# Patient Record
Sex: FEMALE
[De-identification: names, ages, dates, MRNs, and addresses within clinical notes are randomized per-mention and may not be internally consistent; named-entity substitution may affect disease eponyms.]

---

## 2017-06-05 ENCOUNTER — HOSPITAL ENCOUNTER (EMERGENCY)
Dept: HOSPITAL 31 - C.ER | Age: 42
Discharge: HOME | End: 2017-06-05
Payer: MEDICAID

## 2017-06-05 VITALS
RESPIRATION RATE: 18 BRPM | SYSTOLIC BLOOD PRESSURE: 137 MMHG | OXYGEN SATURATION: 99 % | DIASTOLIC BLOOD PRESSURE: 74 MMHG | HEART RATE: 93 BPM

## 2017-06-05 VITALS — TEMPERATURE: 98 F
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
## 2017-06-05 DIAGNOSIS — S86.812A: Primary | ICD-10-CM

## 2017-06-05 NOTE — MRI
MRI left calf 



History: Tendon rupture. 



Comparison: None available. 



Technique: Multi-echo multiplanar sequences were performed through 

the left calf without the use of intravenous contrast. 



Findings: 



Prominent edema, fluid, and hemorrhage seen at the fascial interface 

of the medial head of the gastrocnemius muscle and soleus musculature 

suggestive for a possible plantaris tendon rupture. Additional signal 

abnormality seen within muscle belly of the soleus musculature 

suggestive for muscle strain and or partial tearing. 



Remainder of the visualized musculature appears preserved. 



Visualized osseous structures are preserved. 



Impression: 



Prominent edema, fluid, and hemorrhage seen at the fascial interface 

of the medial head of the gastrocnemius muscle and soleus musculature 

suggestive for a possible plantaris tendon rupture. Additional signal 

abnormality seen within muscle belly of the soleus musculature 

suggestive for muscle strain and or partial tearing.

## 2017-06-05 NOTE — C.PDOC
History Of Present Illness


41 yr old female presents to the ER stating few days ago, she was running for 

the light rail when she felt a sudden sharp pain in her left calf and was 

unable to walk afterwards. Patient states the pain has continued and its worse 

with pressure on the leg. Patient denies any known trauma or injury, back pain, 

weakness or numbness. 


Time Seen by Provider: 06/05/17 12:06


Chief Complaint (Nursing): Lower Extremity Problem/Injury


History Per: Patient


History/Exam Limitations: no limitations (Few days)


Current Symptoms Are (Timing): Still Present





Past Medical History


Reviewed: Historical Data, Nursing Documentation, Vital Signs


Vital Signs: 


 Last Vital Signs











Temp  98 F   06/05/17 11:48


 


Pulse  100 H  06/05/17 11:48


 


Resp  16   06/05/17 11:48


 


BP  128/86   06/05/17 11:48


 


Pulse Ox  97   06/05/17 15:36











Family History: States: No Known Family Hx





- Social History


Hx Alcohol Use: Yes (WINE)


Hx Substance Use: No





- Immunization History


Hx Tetanus Toxoid Vaccination: Yes





Review Of Systems


Except As Marked, All Systems Reviewed And Found Negative.


Musculoskeletal: Positive for: Other ((+) Left calf pain ).  Negative for: Back 

Pain


Neurological: Negative for: Weakness, Numbness





Physical Exam





- Physical Exam


Appears: Well, Non-toxic, No Acute Distress


Skin: Warm, Dry, No Rash


Head: Atraumatic, Normacephalic


Extremity: Calf Tenderness (Left calf, marked tenderness), Capillary Refill (<2)

, No Deformity, Swelling (Left calf, moderate swelling )


Pulses: Left Dorsalis Pedis: Normal, Right Dorsalis Pedis: Normal


Neurological/Psych: Oriented x3, Normal Speech, Normal Motor





ED Course And Treatment


O2 Sat by Pulse Oximetry: 97





- CT Scan/US


  ** MRI - Lower Extremity 


Other Rad Studies (CT/US): Read By Radiologist, Radiology Report Reviewed


CT/US Interpretation: MRI left calf.  History: Tendon rupture.  Comparison: 

None available.  Technique: Multi-echo multiplanar sequences were performed 

through the left calf without the use of intravenous contrast.  Findings:  

Prominent edema, fluid, and hemorrhage seen at the fascial interface of the 

medial head of the gastrocnemius muscle and soleus musculature suggestive for a 

possible plantaris tendon rupture. Additional signal abnormality seen within 

muscle belly of the soleus musculature suggestive for muscle strain and or 

partial tearing.  Remainder of the visualized musculature appears preserved.  

Visualized osseous structures are preserved.  Impression:  Prominent edema, 

fluid, and hemorrhage seen at the fascial interface of the medial head of the 

gastrocnemius muscle and soleus musculature suggestive for a possible plantaris 

tendon rupture. Additional signal abnormality seen within muscle belly of the 

soleus musculature suggestive for muscle strain and or partial tearing.





Medical Decision Making


Medical Decision Making: 





* MRI - Lower Extremity 


REsults discussed with pt ie strain muscle rupture


plan continue crutch walking f/u ortho rx pain meds





Disposition


Counseled Patient/Family Regarding: Diagnosis, Need For Followup





- Disposition


Referrals: 


Reggie Varghese III, MD [Staff Provider] - 


Disposition: HOME/ ROUTINE


Disposition Time: 16:02


Condition: GOOD


Prescriptions: 


Naproxen [Naprosyn] 1 tab PO BID PRN #25 tab


 PRN Reason: Pain


oxyCODONE/Acetaminophen [Percocet 5/325 mg Tab] 1 tab PO Q4H PRN #15 tab


 PRN Reason: .severe pain 


Forms:  Work Excuse





- Clinical Impression


Clinical Impression: 


 Muscle strain, Rupture of left plantaris tendon








- Scribe Statement


The provider has reviewed the documentation as recorded by the Sudhaibbailee Johnson


Provider Attestation: 


All medical record entries made by the Sudhaibbailee were at my direction and 

personally dictated by me. I have reviewed the chart and agree that the record 

accurately reflects my personal performance of the history, physical exam, 

medical decision making, and the department course for this patient. I have 

also personally directed, reviewed, and agree with the discharge instructions 

and disposition.

## 2017-07-24 ENCOUNTER — HOSPITAL ENCOUNTER (EMERGENCY)
Dept: HOSPITAL 31 - C.ER | Age: 42
Discharge: HOME | End: 2017-07-24
Payer: MEDICAID

## 2017-07-24 VITALS
TEMPERATURE: 98.9 F | RESPIRATION RATE: 18 BRPM | HEART RATE: 61 BPM | OXYGEN SATURATION: 98 % | DIASTOLIC BLOOD PRESSURE: 78 MMHG | SYSTOLIC BLOOD PRESSURE: 122 MMHG

## 2017-07-24 DIAGNOSIS — N39.0: Primary | ICD-10-CM

## 2017-07-24 LAB
BACTERIA #/AREA URNS HPF: (no result) /[HPF]
BILIRUB UR-MCNC: NEGATIVE MG/DL
GLUCOSE UR STRIP-MCNC: NORMAL MG/DL
HCG,QUALITATIVE URINE: NEGATIVE
LEUKOCYTE ESTERASE UR-ACNC: (no result) LEU/UL
PH UR STRIP: 7 [PH] (ref 5–8)
PROT UR STRIP-MCNC: NEGATIVE MG/DL
RBC # UR STRIP: (no result) /UL
SP GR UR STRIP: 1 (ref 1–1.03)
URINE NITRATE: NEGATIVE
UROBILINOGEN UR-MCNC: NORMAL MG/DL (ref 0.2–1)

## 2017-07-24 NOTE — C.PDOC
History Of Present Illness





Patient is a 40 y/o F with hx of uti's, presenting with 2 days history of 

dysuria and frequency.  She reports that she has a history of utis and reports 

that this feels similar.  Denies fever, flank pain, abdominal pain, nausea/

vomiting.


Time Seen by Provider: 07/24/17 13:14


Chief Complaint (Nursing): Female Genitourinary





Past Medical History


Vital Signs: 


 Last Vital Signs











Temp  98.9 F   07/24/17 13:13


 


Pulse  61   07/24/17 13:13


 


Resp  18   07/24/17 13:13


 


BP  122/78   07/24/17 13:13


 


Pulse Ox  98   07/24/17 14:02














- Medical History


PMH: No Chronic Diseases


Family History: States: Unknown Family Hx





- Social History


Hx Alcohol Use: Yes (WINE)


Hx Substance Use: No





- Immunization History


Hx Tetanus Toxoid Vaccination: Yes


Hx Influenza Vaccination: No


Hx Pneumococcal Vaccination: No





Review Of Systems


Constitutional: Negative for: Fever, Chills


Cardiovascular: Negative for: Chest Pain, Palpitations


Respiratory: Negative for: Cough, Shortness of Breath, SOB with Excertion, 

Wheezing


Gastrointestinal: Negative for: Nausea, Vomiting, Abdominal Pain, Diarrhea, 

Constipation


Genitourinary: Positive for: Dysuria, Frequency.  Negative for: Vaginal 

Discharge, Vaginal Bleeding, Pelvic Pain


Musculoskeletal: Negative for: Back Pain


Skin: Negative for: Rash


Neurological: Negative for: Weakness, Numbness





Physical Exam





- Physical Exam


Appears: Well, Non-toxic, No Acute Distress


Skin: Normal Color, Warm, Dry


Head: Atraumatic, Normacephalic


Eye(s): bilateral: Normal Inspection, PERRL, EOMI


Cardiovascular: Rhythm Regular


Respiratory: Normal Breath Sounds


Gastrointestinal/Abdominal: Soft, No Tenderness, No Mass, No Distention


Back: Normal Inspection, No CVA Tenderness


Extremity: Normal ROM


Neurological/Psych: Oriented x3


Gait: Steady





ED Course And Treatment


O2 Sat by Pulse Oximetry: 98





Medical Decision Making


Medical Decision Making: 





UA is consistent with uti.





Disposition





- Disposition


Disposition: HOME/ ROUTINE


Disposition Time: 14:01


Condition: GOOD


Additional Instructions: 


Follow-up with PMD within 2 days.  Return to ED if condition worsens.


Prescriptions: 


Nitrofurantoin Macrocrystals [Macrobid] 100 mg PO BID #10 cap


Instructions:  Urinary Tract Infection in Women (ED)





- Clinical Impression


Clinical Impression: 


 UTI (urinary tract infection)

## 2018-01-02 ENCOUNTER — HOSPITAL ENCOUNTER (EMERGENCY)
Dept: HOSPITAL 31 - C.ER | Age: 43
Discharge: HOME | End: 2018-01-02
Payer: MEDICAID

## 2018-01-02 VITALS
SYSTOLIC BLOOD PRESSURE: 149 MMHG | RESPIRATION RATE: 18 BRPM | OXYGEN SATURATION: 98 % | DIASTOLIC BLOOD PRESSURE: 88 MMHG | HEART RATE: 78 BPM | TEMPERATURE: 97.9 F

## 2018-01-02 DIAGNOSIS — K04.7: Primary | ICD-10-CM

## 2018-01-02 NOTE — C.PDOC
History Of Present Illness


41 y/o female, with history of a cracked tooth, presents to the ER for 

evaluation of an upper dental problem which began 3 days ago. Patient reports 

that she has a headache pain to the side of her face felt  her tongue. Patient 

denies having any other medical problems.


Time Seen by Provider: 01/02/18 09:44


Chief Complaint (Nursing): Dental Pain


History/Exam Limitations: no limitations


Onset/Duration Of Symptoms: Days


Current Symptoms Are (Timing): Still Present


Severity: Moderate





Past Medical History


Reviewed: Historical Data, Nursing Documentation, Vital Signs


Vital Signs: 


 Last Vital Signs











Temp  97.9 F   01/02/18 09:54


 


Pulse  78   01/02/18 09:54


 


Resp  18   01/02/18 09:54


 


BP  149/88   01/02/18 09:54


 


Pulse Ox  98   01/02/18 11:39














- Medical History


PMH: No Chronic Diseases


Surgical History: No Surg Hx


Family History: States: No Known Family Hx





- Social History


Hx Alcohol Use: Yes


Hx Substance Use: No





- Immunization History


Hx Tetanus Toxoid Vaccination: Yes


Hx Influenza Vaccination: No


Hx Pneumococcal Vaccination: No





Review Of Systems


Except As Marked, All Systems Reviewed And Found Negative.


Constitutional: Negative for: Fever, Chills


ENT: Negative for: Ear Pain


Respiratory: Negative for: Cough


Gastrointestinal: Negative for: Nausea, Vomiting





Physical Exam





- Physical Exam


Appears: Non-toxic, No Acute Distress


Skin: Normal Color, Warm


Head: Atraumatic, Normacephalic


Eye(s): bilateral: Normal Inspection, PERRL


Oral Mucosa: Moist


Gingiva: Normal Appearing, No Erythema, No Bleeding, No Abscess


Extremity: Normal ROM


Neurological/Psych: Oriented x3, Normal Speech, Normal Cognition, Normal Motor, 

Normal Sensation





ED Course And Treatment


O2 Sat by Pulse Oximetry: 98 (RA)


Pulse Ox Interpretation: Normal





Medical Decision Making


Medical Decision Making: 


Impression:


Upper Dental Problem


Plan:


--Tylenol - 975 mg PO


--Motrin -  600 mg PO





Disposition


Counseled Patient/Family Regarding: Diagnosis, Need For Followup, Rx Given





- Disposition


Disposition: HOME/ ROUTINE


Disposition Time: 10:12


Condition: STABLE


Additional Instructions: 


Follow up with your dentist


Prescriptions: 


Ibuprofen [Motrin] 600 mg PO TID #15 tab


Penicillin VK [Penicillin VK Tab] 500 mg PO QID #40 tab


Instructions:  Toothache (ED)


Forms:  General Discharge Instructions, [employer] (English), Work Excuse





- POA


Present On Arrival: None





- Clinical Impression


Clinical Impression: 


 Dental abscess








- Scribe Statement


The provider has reviewed the documentation as recorded by the Scribe


Eduardo Hilliard


Provider Attestation: 





All medical record entries made by the Scribe were at my direction and 

personally dictated by me. I have reviewed the chart and agree that the record 

accurately reflects my personal performance of the history, physical exam, 

medical decision making, and the department course for this patient. I have 

also personally directed, reviewed, and agree with the discharge instructions 

and disposition.